# Patient Record
Sex: FEMALE | URBAN - METROPOLITAN AREA
[De-identification: names, ages, dates, MRNs, and addresses within clinical notes are randomized per-mention and may not be internally consistent; named-entity substitution may affect disease eponyms.]

---

## 2019-05-19 ENCOUNTER — HOSPITAL ENCOUNTER (OUTPATIENT)
Dept: TELEMEDICINE | Facility: HOSPITAL | Age: 69
Discharge: HOME | End: 2019-05-19
Payer: MEDICARE

## 2019-05-19 PROCEDURE — 99232 PR SUBSEQUENT HOSPITAL CARE,LEVL II: ICD-10-PCS | Mod: GT,,, | Performed by: PSYCHIATRY & NEUROLOGY

## 2019-05-19 PROCEDURE — 99232 SBSQ HOSP IP/OBS MODERATE 35: CPT | Mod: GT,,, | Performed by: PSYCHIATRY & NEUROLOGY

## 2019-05-19 NOTE — CONSULTS
[facility]  Vascular Neurology  Comprehensive Stroke Center  Tele-Consultation Note      Consults    Consulting Provider: LUL LI  Current Providers  No providers found    Patient Location: [facility] Emergency Department  Spoke hospital nurse at bedside with patient assisting consultant.     Patient information was obtained from relative(s) and ED nurse.         Assessment/Plan:   69 y/o with HTN, R MCA infarct, throat cancer, ETOH use, brought in due to confusion and slurred speech after a fall at a neighbor house where EMS reported she was drinking alcohol.  Patient screaming, agitated in the ED but few minutes later calmed down and was able to follow some simple commands.  NIHSS 6, CTH without acute abnormality.  New findings as per her sister are confusion and slurred speech.  ETOH level pending.  Unclear if presentation due to ETOH intoxication or an acute infarct, but the thruth of the matter is that her only new deficits are confusion and slurred speech which could also be due to ETOH and thus I will be hesitant to administer iv alteplase.  Complete medical work up. Can get MRI brain in am if no medical cause for her presentation is identified.        STROKE DOCUMENTATION     Acute Stroke Times:   Acute Stroke Times   Last Known Normal Date: 05/19/19  Last Known Normal Time: 0020  Symptom Onset Date: 05/19/19  Symptom Onset Time: 0020  Stroke Team Called Date: 05/19/19  Stroke Team Called Time: 0150  Stroke Team Arrival Date: 05/19/19  Stroke Team Arrival Time: 0155  CT Interpretation Time: 0155  Decision to Treat Time for Alteplase: (No iv alteplse)  Decision to Treat Time for IR: (No IR candidate)    NIH Scale:  Interval: baseline  1a. Level of Consciousness: 1-->Not alert, but arousable by minor stimulation to obey, answer, or respond  1b. LOC Questions: 1-->Answers one question correctly  1c. LOC  Commands: 0-->Performs both tasks correctly  2. Best Gaze: 0-->Normal  3. Visual: 0-->No visual loss  4. Facial Palsy: 2-->Partial paralysis (total or near-total paralysis of lower face)  5a. Motor Arm, Left: 0-->No drift, limb holds 90 (or 45) degrees for full 10 secs  5b. Motor Arm, Right: 0-->No drift, limb holds 90 (or 45) degrees for full 10 secs  6a. Motor Leg, Left: 0-->No drift, leg holds 30 degree position for full 5 secs  6b. Motor Leg, Right: 0-->No drift, leg holds 30 degree position for full 5 secs  7. Limb Ataxia: 0-->Absent  8. Sensory: 0-->Normal, no sensory loss  9. Best Language: 0-->No aphasia, normal  10. Dysarthria: 2-->Severe dysarthria, patients speech is so slurred as to be unintelligible in the absence of or out of proportion to any dysphasia, or is mute/anarthric  11. Extinction and Inattention (formerly Neglect): 0-->No abnormality  Total (NIH Stroke Scale): 6     Modified Stewart Score: 2  Ascencion Coma Scale:13   ABCD2 Score:    [id number] Score:   HAS -BLED Score:   ICH Score:   Hunt & Haynes Classification:       Diagnoses: confusion. Slurred speech.   No new Assessment & Plan notes have been filed under this hospital service since the last note was generated.  Service: Vascular Neurology      There were no vitals taken for this visit.  Alteplase Eligible?: No  Alteplase Recommendation: Alteplase not recommended due to Suspected stroke mimic   Possible Interventional Revascularization Candidate? No; No significant neurological deficit    Disposition Recommendation: admit to inpatient    Subjective:     History of Present Illness: 67 y/o with HTN, R MCA infarct, throat cancer, ETOH use, brought in due to confusion and slurred speech after a fall at a neighbor house where EMS reported she was drinking alcohol.  Patient screaming, agitated in the ED but few minutes later calmed down and was able to follow some simple commands.        No notes on file      Woke up with symptoms?:  no    Recent bleeding noted: no  Does the patient take any Blood Thinners? no  Medications: Antiplatelets:  none and none      Past Medical History: hypertension, stroke and Cancer    Past Surgical History: no major surgeries within the last 2 weeks    Family History: no relevant history    Social History: drinking    Allergies: Allergies have not been reviewed No known drug allergies    Review of Systems   Unable to perform ROS: Mental status change     Objective:   Vitals: There were no vitals taken for this visit. BP: 140/90, Respiratory Rate: 16 and Heart Rate: 77    CT READ: Yes  No hemmorhage. No mass effect. No early infarct signs.     Physical Exam   Constitutional: She appears well-developed and well-nourished.   HENT:   Head: Normocephalic and atraumatic.   Eyes: Pupils are equal, round, and reactive to light. EOM are normal.   Neck: Normal range of motion. Neck supple.   Cardiovascular: Normal rate and regular rhythm.   Pulmonary/Chest: No respiratory distress.   Abdominal: She exhibits no mass.   Genitourinary:   Genitourinary Comments: No performed   Musculoskeletal: Normal range of motion. She exhibits edema.   Neurological: She is alert. A cranial nerve deficit is present. No sensory deficit. Coordination normal.   Confused, agitated     Skin: No rash noted. She is not diaphoretic. No erythema.   Psychiatric:   agitated   Nursing note and vitals reviewed.            Recommended the emergency room physician to have a brief discussion with the patient and/or family if available regarding the risks and benefits of treatment, and to briefly document the occurrence of that discussion in his clinical encounter note.     The attending portion of this evaluation, treatment, and documentation was performed per Marc Venegas MD via audiovisual.    Billing code:  (non-intervention mild to moderate stroke, TIA, some mimics)    · This patient has a critical neurological condition/illness, with some  potential for high morbidity and mortality.  · There is a moderate probability for acute neurological change leading to clinical and possibly life-threatening deterioration requiring highest level of physician preparedness for urgent intervention.  · Care was coordinated with other physicians involved in the patient's care.  · Radiologic studies and laboratory data were reviewed and interpreted, and plan of care was re-assessed based on the results.  · Diagnosis, treatment options and prognosis may have been discussed with the patient and/or family members or caregiver.      In your opinion, this was a: Tier 1 Van Negative    Consult End Time: 2:10 am    Marc Venegas MD  Comprehensive Stroke Center  Vascular Neurology   [facility]

## 2019-05-19 NOTE — SUBJECTIVE & OBJECTIVE
Woke up with symptoms?: no    Recent bleeding noted: no  Does the patient take any Blood Thinners? no  Medications: Antiplatelets:  none and none      Past Medical History: hypertension, stroke and Cancer    Past Surgical History: no major surgeries within the last 2 weeks    Family History: no relevant history    Social History: drinking    Allergies: Allergies have not been reviewed No known drug allergies    Review of Systems   Unable to perform ROS: Mental status change     Objective:   Vitals: There were no vitals taken for this visit. BP: 140/90, Respiratory Rate: 16 and Heart Rate: 77    CT READ: Yes  No hemmorhage. No mass effect. No early infarct signs.     Physical Exam   Constitutional: She appears well-developed and well-nourished.   HENT:   Head: Normocephalic and atraumatic.   Eyes: Pupils are equal, round, and reactive to light. EOM are normal.   Neck: Normal range of motion. Neck supple.   Cardiovascular: Normal rate and regular rhythm.   Pulmonary/Chest: No respiratory distress.   Abdominal: She exhibits no mass.   Genitourinary:   Genitourinary Comments: No performed   Musculoskeletal: Normal range of motion. She exhibits edema.   Neurological: She is alert. A cranial nerve deficit is present. No sensory deficit. Coordination normal.   Confused, agitated     Skin: No rash noted. She is not diaphoretic. No erythema.   Psychiatric:   agitated   Nursing note and vitals reviewed.